# Patient Record
Sex: FEMALE | URBAN - METROPOLITAN AREA
[De-identification: names, ages, dates, MRNs, and addresses within clinical notes are randomized per-mention and may not be internally consistent; named-entity substitution may affect disease eponyms.]

---

## 2021-08-30 ENCOUNTER — APPOINTMENT (RX ONLY)
Dept: URBAN - METROPOLITAN AREA CLINIC 33 | Facility: CLINIC | Age: 66
Setting detail: DERMATOLOGY
End: 2021-08-30

## 2021-08-30 VITALS
HEIGHT: 62.5 IN | HEART RATE: 74 BPM | DIASTOLIC BLOOD PRESSURE: 87 MMHG | SYSTOLIC BLOOD PRESSURE: 137 MMHG | WEIGHT: 133 LBS

## 2021-08-30 DIAGNOSIS — R03.0 ELEVATED BLOOD-PRESSURE READING, WITHOUT DIAGNOSIS OF HYPERTENSION: ICD-10-CM

## 2021-08-30 DIAGNOSIS — R21 RASH AND OTHER NONSPECIFIC SKIN ERUPTION: ICD-10-CM

## 2021-08-30 PROCEDURE — ? COUNSELING

## 2021-08-30 PROCEDURE — ? PRESCRIPTION

## 2021-08-30 PROCEDURE — ? REFERRAL CORRESPONDENCE

## 2021-08-30 PROCEDURE — 99203 OFFICE O/P NEW LOW 30 MIN: CPT

## 2021-08-30 PROCEDURE — ? PLAN FOR BMI MANAGEMENT

## 2021-08-30 RX ORDER — CLOBETASOL PROPIONATE 0.5 MG/ML
THIN LAYER SOLUTION TOPICAL
Qty: 50 | Refills: 0 | Status: ERX | COMMUNITY
Start: 2021-08-30

## 2021-08-30 RX ADMIN — CLOBETASOL PROPIONATE THIN LAYER: 0.5 SOLUTION TOPICAL at 00:00

## 2021-08-30 ASSESSMENT — LOCATION SIMPLE DESCRIPTION DERM
LOCATION SIMPLE: LEFT ANKLE
LOCATION SIMPLE: LEFT PRETIBIAL REGION
LOCATION SIMPLE: RIGHT UPPER ARM
LOCATION SIMPLE: LEFT POSTERIOR THIGH
LOCATION SIMPLE: LEFT ANTERIOR NECK
LOCATION SIMPLE: LEFT UPPER ARM
LOCATION SIMPLE: RIGHT ANTERIOR NECK
LOCATION SIMPLE: RIGHT THIGH
LOCATION SIMPLE: NECK
LOCATION SIMPLE: LEFT FOREARM
LOCATION SIMPLE: RIGHT POSTERIOR UPPER ARM
LOCATION SIMPLE: LEFT SUBMANDIBULAR AREA
LOCATION SIMPLE: RIGHT PRETIBIAL REGION
LOCATION SIMPLE: RIGHT CALF
LOCATION SIMPLE: RIGHT FOREARM
LOCATION SIMPLE: LEFT POSTERIOR UPPER ARM
LOCATION SIMPLE: POSTERIOR NECK
LOCATION SIMPLE: LEFT FOOT

## 2021-08-30 ASSESSMENT — LOCATION DETAILED DESCRIPTION DERM
LOCATION DETAILED: LEFT DISTAL DORSAL FOREARM
LOCATION DETAILED: RIGHT PROXIMAL DORSAL FOREARM
LOCATION DETAILED: LEFT DISTAL POSTERIOR THIGH
LOCATION DETAILED: RIGHT MEDIAL TRAPEZIAL NECK
LOCATION DETAILED: RIGHT DISTAL PRETIBIAL REGION
LOCATION DETAILED: LEFT SUBMANDIBULAR AREA
LOCATION DETAILED: RIGHT ANTERIOR PROXIMAL UPPER ARM
LOCATION DETAILED: RIGHT PROXIMAL CALF
LOCATION DETAILED: LEFT DISTAL PRETIBIAL REGION
LOCATION DETAILED: RIGHT ANTERIOR PROXIMAL THIGH
LOCATION DETAILED: LEFT ANTERIOR PROXIMAL UPPER ARM
LOCATION DETAILED: LEFT DORSAL FOOT
LOCATION DETAILED: RIGHT PROXIMAL POSTERIOR UPPER ARM
LOCATION DETAILED: RIGHT INFERIOR ANTERIOR NECK
LOCATION DETAILED: LEFT PROXIMAL POSTERIOR UPPER ARM
LOCATION DETAILED: LEFT ANKLE
LOCATION DETAILED: RIGHT CENTRAL LATERAL NECK
LOCATION DETAILED: LEFT INFERIOR LATERAL NECK

## 2021-08-30 ASSESSMENT — LOCATION ZONE DERM
LOCATION ZONE: FACE
LOCATION ZONE: ARM
LOCATION ZONE: FEET
LOCATION ZONE: NECK
LOCATION ZONE: LEG

## 2021-08-30 NOTE — HPI: SKIN LESIONS
Is This A New Presentation, Or A Follow-Up?: Skin Lesions
How Severe Is Your Skin Lesion?: severe
Have Your Skin Lesions Been Treated?: been treated
Additional History: Patient is also using hydrocortisone

## 2021-08-30 NOTE — PROCEDURE: COUNSELING
Patient Specific Counseling (Will Not Stick From Patient To Patient): The patient reviews how she was treated with scabies- and still having problems.  The Triamcinolone didn't seem to help much but when pruritic over the counter Cortisone did.  Explained that the prescriptive steroid should work better.  She was treated for scabies- she notes she isn't sure that it was that helpful.  She does have animals at home however they have all been checked- and do not have problems with fleas.  \\n\\nThere are family history of allergies and personal as well - however no history known of eczema, or asthma.  Explained to her that we may not be able to identify the trigger however we will see if we can get the skin repaired and the pruritus resolved.  If new eruptions happen then we will need to move forward with that information.  It does seem to be resolving.  She is using Eucerin but is having trouble spreading it on the skin . Advised we should change to CeraVe at this point and also put in some prescriptive steroid in one of the jars- and follow plan below that was provided.   We will also plan on follow up in about 3 weeks and then determine if something further will be required.  \\n\\nReviewed/ discussed scratch itch cycle and autoeczematization- \\nRecommend:\\n1 . Keep nails short\\n2 . Cool/Showers/Baths\\n3.  Take measures on handout to NOT scratch\\n4.  Loose fitted clothing\\n5.  Use Cetaphil Gentle Cleanser as a body wash\\n\\nFollow plan below- and if erupting before next visit call -\\n\\n1.  PURCHASE 2 jars (16 oz) of CeraVe cream. This is often found at your pharmacy store.  If you have trouble findings it ask your pharmacist.\\n2.   the prescription of Clobetasol Solution from your pharmacy.\\n3.  When you get home - take a marker to ONE OF THE JARS_  marking the label that says medicated and date the container. MIX ALL of the Clobetasol Solution into one jar of the CeraVe' cream and stir with a butter knife or another utensil - mix well. \\n4.  Apply the Medicated Cream to areas that bother you the most or the most irritated at least 2 - 3 x daily to the problem areas - (really good to do one of the times right after bathing!) \\n5.  THE  PLAIN NON-MEDICATED CeraVe cream needs to be applied neck to toes at least 1 x DAILY -go right over the areas where you used the medicated cream.  YOU MAY REAPPLY THE PLAIN CREAM AS MANY TIMES AS YOU WISH THROUGHOUT THE DAY - BUT HAS TO BE AT LEAST 1 X DAILY NECK TO TOES.\\n\\nGOAL:  To DECREASE and ELIMINATE THE MEDICATED CREAM and daily generous use of PLAIN non-medicated CERAVE CREAM.\\n\\nApplication challenges:\\n1.  If having difficulty reaching an area - options:\\n     *Small foam paint roller\\n      *Back/flat side of back brush covere with       saran or a small plastic bag\\n      *Long strip of vinyl from fabric store cast offs (smear and rub on back)\\n      *Long handled spatula\\n\\nSuggestion to help with itch:  \\n1.  Wet wash cloths- put in the freezer- to become frozen and then applied to an area of itch and allowed to thaw.   A bag of frozen peas covered with a dishcloth for 10-15 minutes will also do much the same thing - this will stimulate the nerves without damaging the skin.  \\n2.  May use CeraVe ITCH cream found over the counter  - for itch relief - these DO NOT REPLACE moisturizing with the plain cream.  \\n3.  Scratching the skin does not repair the skin - it feeds the problem of itch (releases more histamine) - as well as showers and baths that are long and hot. \\n4.  Storing plain cream in refrigerator can be soothing and cooling when applied to the skin\\n\\nSuggestion to help with itch:  \\n1.  Wet wash cloths- put in the freezer- to become frozen and then applied to an area of itch and allowed to thaw.   A bag of frozen peas covered with a dishcloth for 10-15 minutes will also do much the same thing - this will stimulate the nerves without damaging the skin.  \\n2.  May use CeraVe ITCH cream found over the counter  - for itch relief - these DOES NOT REPLACE moisturizing with the plain cream.  \\n3.  Scratching the skin does not repair the skin - it feeds the problem of itch - as well as showers and baths that are long and hot. (these release more histamine - hot showers/scratching) . Take measures to not scratch and keep bathing water temperature as tepid. \\n\\nApplication challenges:\\n1.  If having difficulty  reaching an area - options:\\n     *Small foam paint roller\\n      *Back/flat side of back brush covered with saran or a small plastic bag\\n      *Long strip of vinyl from fabric store cast offs (smear and rub on back)\\n      *Long handled spatula\\n\\nSUGGESTED MOISTURIZERS - these are all in jars!\\n1.  Cetaphil Cream - also can be found at [email or web address] and [email or web address] online as well as any pharmacy type store\\n2.  CeraVe Cream - can be found in any pharmacy store, ordered online or in [facility] office\\n3   Vanicare Cream - can be found in any pharmacy store, ordered online - the ONLY one with a pump
Detail Level: Detailed
Quality 317: Preventative Care And Screening: Screening For High Blood Pressure And Follow-Up Documented: Pre-hypertensive or hypertensive blood pressure reading documented, and the indicated follow-up is documented

## 2021-09-13 ENCOUNTER — APPOINTMENT (RX ONLY)
Dept: URBAN - METROPOLITAN AREA CLINIC 33 | Facility: CLINIC | Age: 66
Setting detail: DERMATOLOGY
End: 2021-09-13

## 2021-09-13 VITALS
SYSTOLIC BLOOD PRESSURE: 115 MMHG | HEART RATE: 75 BPM | HEIGHT: 62.5 IN | WEIGHT: 133 LBS | DIASTOLIC BLOOD PRESSURE: 68 MMHG

## 2021-09-13 DIAGNOSIS — R21 RASH AND OTHER NONSPECIFIC SKIN ERUPTION: ICD-10-CM | Status: UNCHANGED

## 2021-09-13 PROCEDURE — ? PRESCRIPTION

## 2021-09-13 PROCEDURE — ? COUNSELING

## 2021-09-13 PROCEDURE — 99214 OFFICE O/P EST MOD 30 MIN: CPT

## 2021-09-13 RX ORDER — TRIAMCINOLONE ACETONIDE 1 MG/G
THIN LAYER CREAM TOPICAL BID
Qty: 454 | Refills: 0 | Status: ERX | COMMUNITY
Start: 2021-09-13

## 2021-09-13 RX ADMIN — TRIAMCINOLONE ACETONIDE THIN LAYER: 1 CREAM TOPICAL at 00:00

## 2021-09-13 ASSESSMENT — LOCATION ZONE DERM
LOCATION ZONE: HAND
LOCATION ZONE: LEG
LOCATION ZONE: ARM
LOCATION ZONE: TRUNK

## 2021-09-13 ASSESSMENT — LOCATION DETAILED DESCRIPTION DERM
LOCATION DETAILED: LEFT PROXIMAL POSTERIOR UPPER ARM
LOCATION DETAILED: RIGHT RADIAL DORSAL HAND
LOCATION DETAILED: RIGHT SUPERIOR MEDIAL MIDBACK
LOCATION DETAILED: RIGHT PROXIMAL POSTERIOR UPPER ARM
LOCATION DETAILED: LEFT INFERIOR UPPER BACK
LOCATION DETAILED: LEFT PROXIMAL PRETIBIAL REGION
LOCATION DETAILED: RIGHT ANTERIOR PROXIMAL THIGH
LOCATION DETAILED: LEFT RADIAL DORSAL HAND

## 2021-09-13 ASSESSMENT — LOCATION SIMPLE DESCRIPTION DERM
LOCATION SIMPLE: RIGHT POSTERIOR UPPER ARM
LOCATION SIMPLE: LEFT POSTERIOR UPPER ARM
LOCATION SIMPLE: RIGHT LOWER BACK
LOCATION SIMPLE: RIGHT HAND
LOCATION SIMPLE: LEFT UPPER BACK
LOCATION SIMPLE: LEFT PRETIBIAL REGION
LOCATION SIMPLE: LEFT HAND
LOCATION SIMPLE: RIGHT THIGH

## 2021-09-13 NOTE — PROCEDURE: COUNSELING
Patient Specific Counseling (Will Not Stick From Patient To Patient): Patient shares she treated with plan as described however she notes that she gets itchy with the Mixed CeraVe and thinks it is the clobetasol- she had called about that.  Advised unlikely- however she notes that she doesn't have problems with Plain CeraVe.  She has not completed a full jar of plain CeraVe since her last visit.  \\n\\nShe also states she is showering a few times a day because she feels itchy and thinks the cream makes her feel that way and she has to wash it off.  She thinks she is hiving with allergic reactions- do not believe by presentation today that she is.  The eruptive patches seen today <0.5% BSA- total- are not weals and have not roved however she states she has had some that has.\\n\\nShe is using Free and clear wash- but using Dryer sheets- etc.  She notes she has requested referral from primary for Allergist- uncertain that she will find that all that helpful at this point  Also do not believe today biopsy would  be helpful.\\n\\nPatient notes that she didn't have problems with Triamcinolone however seems to with the liquid clobetasol.  So we are going to regroup and treat again. Discussed possibility of Kenalog injection however she could flare considerably when this wears off.  Some if it looks possibility of bites- but there is no evidence by presentation or  location of scabies, for example, and no one else in the household has problems with it.\\n\\nDiscussed possibility of biopsy- however  informed her that this provider is not certain that this will give her what she is wanting to know- it may simply find that the skin is inflamed and irritated.  Would recommend that she treat as noted in plan and then we consider biopsy if not effective.   \\n\\nTREATMENT PLAN AGREED:\\n1.  Avoid scratching  the skin - consider use of cold on handout.\\n2.  Possibly decreasing histamine with use of antihistamines might help her from scratching- along with CeraVe Itch cream and/use of cold and other measures discussed.  Recommend Zyrtec or non-sedating in the AM- and she can use her Benadryl at night if that is helpful.  She also could use Famotidine OTC- 1 - 2 x daily - discussed this as an H2 blocker- and reviewed how to use. These may help.  \\n3.  Use Triamcinolone cream to affected areas 2 x daily follow neck to toes- daily Vanicare cream.  \\n4 . Moisturize daily neck to toes with Vanicare cream at least 1 x daily if not 2 x daily especially after bathing/showering.\\n5.  Reviewed things again that increase histamine release- and recommend avoiding them.\\n6. Allergist may or may not prove helpful - in addressing this current concern.\\n7.  She is dry- recommend use of Cetaphil Gentle Cleanser \\nwhen showering instead of just water- do not use hot shower or bath - but tepid or cool.  \\n8.  Loose fitted clothing.\\n\\nWill FAX notes to Dr. Austin.
Detail Level: Simple